# Patient Record
Sex: FEMALE | Race: WHITE | NOT HISPANIC OR LATINO | Employment: OTHER | URBAN - METROPOLITAN AREA
[De-identification: names, ages, dates, MRNs, and addresses within clinical notes are randomized per-mention and may not be internally consistent; named-entity substitution may affect disease eponyms.]

---

## 2017-01-06 ENCOUNTER — GENERIC CONVERSION - ENCOUNTER (OUTPATIENT)
Dept: OTHER | Facility: OTHER | Age: 82
End: 2017-01-06

## 2017-01-13 ENCOUNTER — GENERIC CONVERSION - ENCOUNTER (OUTPATIENT)
Dept: OTHER | Facility: OTHER | Age: 82
End: 2017-01-13

## 2017-04-27 ENCOUNTER — ALLSCRIPTS OFFICE VISIT (OUTPATIENT)
Dept: OTHER | Facility: OTHER | Age: 82
End: 2017-04-27

## 2017-04-27 DIAGNOSIS — M81.0 AGE-RELATED OSTEOPOROSIS WITHOUT CURRENT PATHOLOGICAL FRACTURE: ICD-10-CM

## 2017-04-27 DIAGNOSIS — I25.10 ATHEROSCLEROTIC HEART DISEASE OF NATIVE CORONARY ARTERY WITHOUT ANGINA PECTORIS: ICD-10-CM

## 2017-04-28 ENCOUNTER — GENERIC CONVERSION - ENCOUNTER (OUTPATIENT)
Dept: OTHER | Facility: OTHER | Age: 82
End: 2017-04-28

## 2017-06-02 DIAGNOSIS — M81.0 AGE-RELATED OSTEOPOROSIS WITHOUT CURRENT PATHOLOGICAL FRACTURE: ICD-10-CM

## 2017-06-15 ENCOUNTER — GENERIC CONVERSION - ENCOUNTER (OUTPATIENT)
Dept: OTHER | Facility: OTHER | Age: 82
End: 2017-06-15

## 2017-06-20 ENCOUNTER — GENERIC CONVERSION - ENCOUNTER (OUTPATIENT)
Dept: OTHER | Facility: OTHER | Age: 82
End: 2017-06-20

## 2017-08-21 ENCOUNTER — ALLSCRIPTS OFFICE VISIT (OUTPATIENT)
Dept: OTHER | Facility: OTHER | Age: 82
End: 2017-08-21

## 2017-08-21 DIAGNOSIS — E87.1 HYPO-OSMOLALITY AND HYPONATREMIA: ICD-10-CM

## 2017-08-21 DIAGNOSIS — D69.6 THROMBOCYTOPENIA (HCC): ICD-10-CM

## 2017-08-21 DIAGNOSIS — R42 DIZZINESS AND GIDDINESS: ICD-10-CM

## 2017-08-21 DIAGNOSIS — M12.88 OTHER SPECIFIC ARTHROPATHIES, NOT ELSEWHERE CLASSIFIED, OTHER SPECIFIED SITE: ICD-10-CM

## 2017-08-21 DIAGNOSIS — M54.2 CERVICALGIA: ICD-10-CM

## 2017-08-21 DIAGNOSIS — R53.83 OTHER FATIGUE: ICD-10-CM

## 2017-08-21 DIAGNOSIS — M48.02 SPINAL STENOSIS OF CERVICAL REGION: ICD-10-CM

## 2017-08-21 DIAGNOSIS — R79.89 OTHER SPECIFIED ABNORMAL FINDINGS OF BLOOD CHEMISTRY: ICD-10-CM

## 2017-08-21 DIAGNOSIS — R73.09 OTHER ABNORMAL GLUCOSE: ICD-10-CM

## 2017-08-21 DIAGNOSIS — I95.9 HYPOTENSION: ICD-10-CM

## 2017-08-29 ENCOUNTER — GENERIC CONVERSION - ENCOUNTER (OUTPATIENT)
Dept: OTHER | Facility: OTHER | Age: 82
End: 2017-08-29

## 2017-09-07 ENCOUNTER — GENERIC CONVERSION - ENCOUNTER (OUTPATIENT)
Dept: OTHER | Facility: OTHER | Age: 82
End: 2017-09-07

## 2017-09-11 ENCOUNTER — TRANSCRIBE ORDERS (OUTPATIENT)
Dept: LAB | Facility: CLINIC | Age: 82
End: 2017-09-11

## 2017-09-11 ENCOUNTER — APPOINTMENT (OUTPATIENT)
Dept: LAB | Facility: CLINIC | Age: 82
End: 2017-09-11
Payer: MEDICARE

## 2017-09-11 DIAGNOSIS — I95.9 HYPOTENSION: ICD-10-CM

## 2017-09-11 DIAGNOSIS — R53.83 OTHER FATIGUE: ICD-10-CM

## 2017-09-11 DIAGNOSIS — R42 DIZZINESS AND GIDDINESS: ICD-10-CM

## 2017-09-11 DIAGNOSIS — R73.09 OTHER ABNORMAL GLUCOSE: ICD-10-CM

## 2017-09-11 LAB
ALBUMIN SERPL BCP-MCNC: 2.4 G/DL (ref 3.5–5)
ALP SERPL-CCNC: 212 U/L (ref 46–116)
ALT SERPL W P-5'-P-CCNC: 83 U/L (ref 12–78)
ANION GAP SERPL CALCULATED.3IONS-SCNC: 9 MMOL/L (ref 4–13)
AST SERPL W P-5'-P-CCNC: 51 U/L (ref 5–45)
BACTERIA UR QL AUTO: ABNORMAL /HPF
BASOPHILS # BLD AUTO: 0.03 THOUSANDS/ΜL (ref 0–0.1)
BASOPHILS NFR BLD AUTO: 1 % (ref 0–1)
BILIRUB SERPL-MCNC: 0.84 MG/DL (ref 0.2–1)
BILIRUB UR QL STRIP: ABNORMAL
BUN SERPL-MCNC: 14 MG/DL (ref 5–25)
CALCIUM SERPL-MCNC: 8.7 MG/DL (ref 8.3–10.1)
CHLORIDE SERPL-SCNC: 98 MMOL/L (ref 100–108)
CLARITY UR: ABNORMAL
CO2 SERPL-SCNC: 24 MMOL/L (ref 21–32)
COLOR UR: ABNORMAL
CREAT SERPL-MCNC: 0.83 MG/DL (ref 0.6–1.3)
EOSINOPHIL # BLD AUTO: 0.13 THOUSAND/ΜL (ref 0–0.61)
EOSINOPHIL NFR BLD AUTO: 3 % (ref 0–6)
ERYTHROCYTE [DISTWIDTH] IN BLOOD BY AUTOMATED COUNT: 13.5 % (ref 11.6–15.1)
EST. AVERAGE GLUCOSE BLD GHB EST-MCNC: 128 MG/DL
FOLATE SERPL-MCNC: 19.9 NG/ML (ref 3.1–17.5)
GFR SERPL CREATININE-BSD FRML MDRD: 63 ML/MIN/1.73SQ M
GLUCOSE SERPL-MCNC: 185 MG/DL (ref 65–140)
GLUCOSE UR STRIP-MCNC: NEGATIVE MG/DL
HBA1C MFR BLD: 6.1 % (ref 4.2–6.3)
HCT VFR BLD AUTO: 37.3 % (ref 34.8–46.1)
HGB BLD-MCNC: 12.6 G/DL (ref 11.5–15.4)
HGB UR QL STRIP.AUTO: NEGATIVE
KETONES UR STRIP-MCNC: ABNORMAL MG/DL
LEUKOCYTE ESTERASE UR QL STRIP: ABNORMAL
LYMPHOCYTES # BLD AUTO: 0.54 THOUSANDS/ΜL (ref 0.6–4.47)
LYMPHOCYTES NFR BLD AUTO: 12 % (ref 14–44)
MAGNESIUM SERPL-MCNC: 2.1 MG/DL (ref 1.6–2.6)
MCH RBC QN AUTO: 31.1 PG (ref 26.8–34.3)
MCHC RBC AUTO-ENTMCNC: 33.8 G/DL (ref 31.4–37.4)
MCV RBC AUTO: 92 FL (ref 82–98)
MONOCYTES # BLD AUTO: 0.26 THOUSAND/ΜL (ref 0.17–1.22)
MONOCYTES NFR BLD AUTO: 6 % (ref 4–12)
NEUTROPHILS # BLD AUTO: 3.58 THOUSANDS/ΜL (ref 1.85–7.62)
NEUTS SEG NFR BLD AUTO: 78 % (ref 43–75)
NITRITE UR QL STRIP: NEGATIVE
NON-SQ EPI CELLS URNS QL MICRO: ABNORMAL /HPF
NRBC BLD AUTO-RTO: 0 /100 WBCS
PH UR STRIP.AUTO: 7 [PH] (ref 4.5–8)
PLATELET # BLD AUTO: 88 THOUSANDS/UL (ref 149–390)
PMV BLD AUTO: 10.5 FL (ref 8.9–12.7)
POTASSIUM SERPL-SCNC: 4.5 MMOL/L (ref 3.5–5.3)
PROT SERPL-MCNC: 6 G/DL (ref 6.4–8.2)
PROT UR STRIP-MCNC: ABNORMAL MG/DL
RBC # BLD AUTO: 4.05 MILLION/UL (ref 3.81–5.12)
RBC #/AREA URNS AUTO: ABNORMAL /HPF
SODIUM SERPL-SCNC: 131 MMOL/L (ref 136–145)
SP GR UR STRIP.AUTO: 1.02 (ref 1–1.03)
TSH SERPL DL<=0.05 MIU/L-ACNC: 0.62 UIU/ML (ref 0.36–3.74)
UROBILINOGEN UR QL STRIP.AUTO: 0.2 E.U./DL
VIT B12 SERPL-MCNC: 1193 PG/ML (ref 100–900)
WBC # BLD AUTO: 4.6 THOUSAND/UL (ref 4.31–10.16)
WBC #/AREA URNS AUTO: ABNORMAL /HPF

## 2017-09-11 PROCEDURE — 83735 ASSAY OF MAGNESIUM: CPT

## 2017-09-11 PROCEDURE — 83036 HEMOGLOBIN GLYCOSYLATED A1C: CPT

## 2017-09-11 PROCEDURE — 36415 COLL VENOUS BLD VENIPUNCTURE: CPT

## 2017-09-11 PROCEDURE — 81001 URINALYSIS AUTO W/SCOPE: CPT

## 2017-09-11 PROCEDURE — 85025 COMPLETE CBC W/AUTO DIFF WBC: CPT

## 2017-09-11 PROCEDURE — 82746 ASSAY OF FOLIC ACID SERUM: CPT

## 2017-09-11 PROCEDURE — 82607 VITAMIN B-12: CPT

## 2017-09-11 PROCEDURE — 84443 ASSAY THYROID STIM HORMONE: CPT

## 2017-09-11 PROCEDURE — 80053 COMPREHEN METABOLIC PANEL: CPT

## 2017-09-12 ENCOUNTER — GENERIC CONVERSION - ENCOUNTER (OUTPATIENT)
Dept: OTHER | Facility: OTHER | Age: 82
End: 2017-09-12

## 2017-09-14 ENCOUNTER — GENERIC CONVERSION - ENCOUNTER (OUTPATIENT)
Dept: OTHER | Facility: OTHER | Age: 82
End: 2017-09-14

## 2017-09-19 ENCOUNTER — GENERIC CONVERSION - ENCOUNTER (OUTPATIENT)
Dept: FAMILY MEDICINE CLINIC | Facility: CLINIC | Age: 82
End: 2017-09-19

## 2017-09-22 ENCOUNTER — TRANSCRIBE ORDERS (OUTPATIENT)
Dept: ADMINISTRATIVE | Facility: HOSPITAL | Age: 82
End: 2017-09-22

## 2017-09-22 ENCOUNTER — ALLSCRIPTS OFFICE VISIT (OUTPATIENT)
Dept: OTHER | Facility: OTHER | Age: 82
End: 2017-09-22

## 2017-09-22 ENCOUNTER — APPOINTMENT (OUTPATIENT)
Dept: RADIOLOGY | Facility: CLINIC | Age: 82
End: 2017-09-22
Payer: MEDICARE

## 2017-09-22 ENCOUNTER — TRANSCRIBE ORDERS (OUTPATIENT)
Dept: LAB | Facility: CLINIC | Age: 82
End: 2017-09-22

## 2017-09-22 ENCOUNTER — APPOINTMENT (OUTPATIENT)
Dept: LAB | Facility: CLINIC | Age: 82
End: 2017-09-22
Payer: MEDICARE

## 2017-09-22 DIAGNOSIS — M65.321 TRIGGER INDEX FINGER OF RIGHT HAND: ICD-10-CM

## 2017-09-22 DIAGNOSIS — R63.4 ABNORMAL WEIGHT LOSS: Primary | ICD-10-CM

## 2017-09-22 DIAGNOSIS — R79.89 OTHER SPECIFIED ABNORMAL FINDINGS OF BLOOD CHEMISTRY: ICD-10-CM

## 2017-09-22 DIAGNOSIS — R53.83 FATIGUE, UNSPECIFIED TYPE: ICD-10-CM

## 2017-09-22 DIAGNOSIS — E87.1 HYPO-OSMOLALITY AND HYPONATREMIA: ICD-10-CM

## 2017-09-22 DIAGNOSIS — R41.3 MEMORY LOSS: ICD-10-CM

## 2017-09-22 LAB
ALBUMIN SERPL BCP-MCNC: 3.1 G/DL (ref 3.5–5)
ALP SERPL-CCNC: 92 U/L (ref 46–116)
ALT SERPL W P-5'-P-CCNC: 26 U/L (ref 12–78)
ANION GAP SERPL CALCULATED.3IONS-SCNC: 8 MMOL/L (ref 4–13)
AST SERPL W P-5'-P-CCNC: 15 U/L (ref 5–45)
BASOPHILS # BLD AUTO: 0.03 THOUSANDS/ΜL (ref 0–0.1)
BASOPHILS NFR BLD AUTO: 1 % (ref 0–1)
BILIRUB SERPL-MCNC: 0.56 MG/DL (ref 0.2–1)
BUN SERPL-MCNC: 8 MG/DL (ref 5–25)
CALCIUM SERPL-MCNC: 9.2 MG/DL (ref 8.3–10.1)
CHLORIDE SERPL-SCNC: 104 MMOL/L (ref 100–108)
CO2 SERPL-SCNC: 26 MMOL/L (ref 21–32)
CREAT SERPL-MCNC: 0.7 MG/DL (ref 0.6–1.3)
EOSINOPHIL # BLD AUTO: 0.05 THOUSAND/ΜL (ref 0–0.61)
EOSINOPHIL NFR BLD AUTO: 1 % (ref 0–6)
ERYTHROCYTE [DISTWIDTH] IN BLOOD BY AUTOMATED COUNT: 14.6 % (ref 11.6–15.1)
GFR SERPL CREATININE-BSD FRML MDRD: 78 ML/MIN/1.73SQ M
GLUCOSE P FAST SERPL-MCNC: 142 MG/DL (ref 65–99)
HCT VFR BLD AUTO: 37.4 % (ref 34.8–46.1)
HGB BLD-MCNC: 12.2 G/DL (ref 11.5–15.4)
LYMPHOCYTES # BLD AUTO: 0.82 THOUSANDS/ΜL (ref 0.6–4.47)
LYMPHOCYTES NFR BLD AUTO: 21 % (ref 14–44)
MCH RBC QN AUTO: 30.8 PG (ref 26.8–34.3)
MCHC RBC AUTO-ENTMCNC: 32.6 G/DL (ref 31.4–37.4)
MCV RBC AUTO: 94 FL (ref 82–98)
MONOCYTES # BLD AUTO: 0.3 THOUSAND/ΜL (ref 0.17–1.22)
MONOCYTES NFR BLD AUTO: 8 % (ref 4–12)
NEUTROPHILS # BLD AUTO: 2.58 THOUSANDS/ΜL (ref 1.85–7.62)
NEUTS SEG NFR BLD AUTO: 69 % (ref 43–75)
NRBC BLD AUTO-RTO: 0 /100 WBCS
PLATELET # BLD AUTO: 232 THOUSANDS/UL (ref 149–390)
PMV BLD AUTO: 9 FL (ref 8.9–12.7)
POTASSIUM SERPL-SCNC: 3.8 MMOL/L (ref 3.5–5.3)
PROT SERPL-MCNC: 6.5 G/DL (ref 6.4–8.2)
RBC # BLD AUTO: 3.96 MILLION/UL (ref 3.81–5.12)
SODIUM SERPL-SCNC: 138 MMOL/L (ref 136–145)
WBC # BLD AUTO: 3.84 THOUSAND/UL (ref 4.31–10.16)

## 2017-09-22 PROCEDURE — 73140 X-RAY EXAM OF FINGER(S): CPT

## 2017-09-22 PROCEDURE — 80053 COMPREHEN METABOLIC PANEL: CPT

## 2017-09-22 PROCEDURE — 85025 COMPLETE CBC W/AUTO DIFF WBC: CPT

## 2017-09-22 PROCEDURE — 36415 COLL VENOUS BLD VENIPUNCTURE: CPT

## 2017-09-23 ENCOUNTER — GENERIC CONVERSION - ENCOUNTER (OUTPATIENT)
Dept: OTHER | Facility: OTHER | Age: 82
End: 2017-09-23

## 2017-09-27 ENCOUNTER — GENERIC CONVERSION - ENCOUNTER (OUTPATIENT)
Dept: OTHER | Facility: OTHER | Age: 82
End: 2017-09-27

## 2017-09-27 ENCOUNTER — HOSPITAL ENCOUNTER (OUTPATIENT)
Dept: MRI IMAGING | Facility: HOSPITAL | Age: 82
Discharge: HOME | End: 2017-09-27
Attending: INTERNAL MEDICINE
Payer: MEDICARE

## 2017-09-27 DIAGNOSIS — R53.83 FATIGUE, UNSPECIFIED TYPE: ICD-10-CM

## 2017-09-27 PROCEDURE — 70553 MRI BRAIN STEM W/O & W/DYE: CPT

## 2017-09-27 PROCEDURE — A9585 GADOBUTROL INJECTION: HCPCS | Performed by: INTERNAL MEDICINE

## 2017-09-27 RX ADMIN — GADOBUTROL 4 ML: 604.72 INJECTION INTRAVENOUS at 15:20

## 2017-09-28 ENCOUNTER — GENERIC CONVERSION - ENCOUNTER (OUTPATIENT)
Dept: OTHER | Facility: OTHER | Age: 82
End: 2017-09-28

## 2017-10-05 ENCOUNTER — GENERIC CONVERSION - ENCOUNTER (OUTPATIENT)
Dept: OTHER | Facility: OTHER | Age: 82
End: 2017-10-05

## 2017-10-06 NOTE — PROGRESS NOTES
Assessment  1  Abnormal weight loss (783 21) (R63 4)   2  Non Hodgkin's lymphoma (202 80) (C85 90)   3  Early satiety (780 94) (R68 81)   4  Memory loss of unknown cause (780 93) (R41 3)   · Very mild and not interferring with ADL  Discussed starting meds, but deferred  5  Sleep disturbances (780 50) (G47 9)    Plan  Abnormal weight loss, Early satiety, Loss of appetite    · *1 -  [facility] Co-Management  *  Status: Hold For - Scheduling  Requested  for: 08YUS3889  Care Summary provided  : Yes  Abnormal weight loss, Non Hodgkin's lymphoma    · 3 - Ananya Sheriff  (Hematology/Oncology) Co-Management  *  Status: Hold For - Scheduling   Requested for: 64FGI2594  are Referring to a non- Preferred Provider : Established Patient  Care Summary provided  : Yes  Cervical facet syndrome, Cervicalgia, Stenosis, cervical spine    · PredniSONE 10 MG Oral Tablet  Memory loss of unknown cause    · Donepezil HCl - 10 MG Oral Tablet; TAKE 1 TABLET BY MOUTH ONCE DAILY  PMH: Vitamin B12 deficiency    · Vitamin B-12 500 MCG Oral Tablet    Discussion/Summary  Discussion Summary:   ??cause  ?all due to worsening dementia  H/o lymphoma and will refer back to onc for second opinion  Refer to GI due to appetite issue  Pt to try better sleep hygiene and pt to increase her melatonin  Continue with supplements  Will increase the aricept  RTC three weeks  Medication SE Review and Pt Understands Tx: Possible side effects of new medications were reviewed with the patient/guardian today  The treatment plan was reviewed with the patient/guardian  The patient/guardian understands and agrees with the treatment plan      Chief Complaint  Chief Complaint Free Text Note Form: The patient presents in office today for a follow up  She has some questions from her pervious hospitalization- She would also like to go over all her recent testing        History of Present Illness  HPI: WF RTC with her neighbor for f/u FTT and wt loss of unknown etiology  Since last visit, wt down another three pounds despite supplements  Remains active and using supplements, but reports becoming full after several bites  Memory still an issue at times  Still can't sleep, but reported to nap during the day  Feeling better otherwise and no s/s pointing in any direction  Extension w/u negative with MRI of the head negative  Review of Systems  Complete-Female:   Constitutional: feeling tired, but-no fever,-not feeling poorly-and-no chills  Cardiovascular: no chest pain,-no intermittent leg claudication,-no palpitations-and-no lower extremity edema  Respiratory: no shortness of breath,-no cough,-no orthopnea,-no wheezing,-no shortness of breath during exertion-and-no PND  Gastrointestinal: no abdominal pain,-no nausea,-no constipation-and-no diarrhea  Genitourinary: no dysuria  Musculoskeletal: No complaints of arthralgias, no myalgias, no joint swelling or stiffness, no limb pain or swelling  Integumentary: No complaints of skin rash or lesions, no itching, no skin wounds, no breast pain or lump  Neurological: confusion, but-as noted in HPI,-no headache-and-no convulsions  Psychiatric: no anxiety-and-no depression  Endocrine: No complaints of proptosis, no hot flashes, no muscle weakness, no deepening of the voice, no feelings of weakness  Hematologic/Lymphatic: No complaints of swollen glands, no swollen glands in the neck, does not bleed easily, does not bruise easily  Active Problems  1  Abnormal glucose (790 29) (R73 09)   2  Abnormal thyroid blood test (794 5) (R94 6)   3  Abnormal weight loss (783 21) (R63 4)   4  Asymptomatic menopausal state (V49 81) (Z78 0)   5  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 85) (K22 70)   6  Cardiovascular arteriosclerosis (429 2,440 9) (I25 10)   7  Cervical facet syndrome (723 8) (M12 88)   8  Cervicalgia (723 1) (M54 2)   9  Cervico-occipital neuralgia of left side (723 8) (M54 81)   10   Contact dermatitis (692 9) (L25 9)   11  Daily nausea (787 02) (R11 0)   12  Decreased range of motion of intervertebral discs of cervical spine (723 8) (M53 82)   13  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) (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)   14  Diverticulosis (562 10) (K57 90)   15  Dizziness (780 4) (R42)   16  Elevated LFTs (790 6) (R79 89)   17  Encounter for screening mammogram for malignant neoplasm of breast (V76 12) (Z12 31)   18  Fatigue (780 79) (R53 83)   19  Fibrocystic breast disease, unspecified laterality (610 1) (N60 19)   20  Glaucoma (365 9) (H40 9)   21  Hypercholesterolemia (272 0) (E78 00)   22  Hypertension (401 9) (I10)   23  Hyponatremia (276 1) (E87 1)   24  Hypotension (458 9) (I95 9)   25  Loss of appetite (783 0) (R63 0)   26  Memory loss of unknown cause (780 93) (R41 3)   27  Non Hodgkin's lymphoma (202 80) (C85 90)   28  Osteoarthritis (715 90) (M19 90)   29  Osteoporosis (733 00) (M81 0)   30  Overactive bladder (596 51) (N32 81)   31  Peripheral neuropathy (356 9) (G62 9)   32  Raynaud's disease (443 0) (I73 00)   33  Screening for genitourinary condition (V81 6) (Z13 89)   34  Sleep disturbances (780 50) (G47 9)   35  Special screening for other neurological conditions (V80 09) (Z13 89)   36  Stenosis, cervical spine (723 0) (M48 02)   37  Thrombocytopenia (287 5) (D69 6)   38  Trigger index finger of right hand (727 03) (M65 321)   39  Urinary incontinence (788 30) (R32)   40  Urinary tract infection (599 0) (N39 0)    Past Medical History  1  History of Acute leg pain, right (729 5) (M79 604)   2  History of Belching (787 3) (R14 2)   3  History of Change in stool habits (787 99) (R19 4)   4  History of Cold intolerance (780 99) (R68 89)   5  History of Costochondritis (733 6) (M94 0)   6  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)   7  History of Drug intolerance (995 27) (Z78 9)   8  History of Dysgeusia (781 1) (R43 2)   9  History of Dysuria (788 1) (R30 0)   10   History of Elevated rheumatoid factor (795 79) (R76 8)   11  History of Fall, initial encounter (E888 9) (W19 XXXA)   12  History of Gait disturbance (781 2) (R26 9)   13  History of Generalized abdominal pain (789 07) (R10 84)   14  History of Healing wound (959 9) (T14 90XD)   15  History of Hematoma (924 9) (T14 8XXA)   16  History of acute sinusitis (V12 69) (Z87 09)   17  History of allergic rhinitis (V12 69) (Z87 09)   18  History of cataract (V12 49) (Z86 69)   19  History of gastric ulcer (V12 79) (Z87 19)   20  History of herpes zoster (V12 09) (Z86 19)   21  History of paronychia of finger (V13 3) (Z87 2)   22  History of paronychia of finger (V13 3) (Z87 2)   23  History of sleep disturbance (V13 89) (Z87 898)   24  History of syncope (V15 89) (Z87 898)   25  History of vertigo (V12 49) (Z87 898)   26  History of Internal Derangement Of Medial Meniscus Of Knee (717 3)   27  History of Joint pain, knee (719 46) (M25 569)   28  History of Knee Injury (959 7)   29  History of Lateral epicondylitis of right elbow (726 32) (M77 11)   30  History of Leg swelling (729 81) (M79 89)   31  History of Muscle Hematoma (924 9)   32  History of Rectal discomfort (569 42) (K62 89)   33  History of Skin lesion (709 9) (L98 9)   34  History of Sprained Ribs (848 3)   35  History of Strain of right knee, initial encounter (844 9) (S86 911A)   36  History of Synovial cyst of popliteal space (727 51) (M71 20)  Active Problems And Past Medical History Reviewed: The active problems and past medical history were reviewed and updated today  Surgical History  1  History of Cataract Surgery  Surgical History Reviewed: The surgical history was reviewed and updated today  Family History  Father    1  Family history of Colon Cancer (V16 0)   2  Family history of malignant neoplasm (V16 9) (Z80 9)  Paternal Grandmother    3  Family history of Malignant neoplasm of breast, unspecified laterality  Paternal Grandfather    4   Family history of Coronary artery disease (414 00) (I25 10)  Family History Reviewed: The family history was reviewed and updated today  Social History   · Never A Smoker   · Social alcohol use (Z78 9)  Social History Reviewed: The social history was reviewed and updated today  The social history was reviewed and is unchanged  Current Meds   1  Brimonidine Tartrate 0 15 % Ophthalmic Solution; Therapy: 56MER4755 to Recorded   2  Calcium Carbonate-Vitamin D 600-125 MG-UNIT TABS; Take 2 twice daily; Therapy: 63VXY9141 to Recorded   3  Donepezil HCl - 5 MG Oral Tablet; Take 1 tablet by mouth at bedtime; Therapy: 79YJH6937 to (Evaluate:11Aug2017)  Requested for: 33Nnv3360; Last Rx:55Bqy2170   Ordered   4  Evista 60 MG Oral Tablet; TAKE ONE (1) TABLET BY MOUTH ONCE DAILY; Therapy: 38OLU7176 to Recorded   5  Fish Oil 1000 MG Oral Capsule; TAKE 1 CAPSULE DAILY; Therapy: 65NZY1255 to Recorded   6  Glucosamine 500 MG Oral Capsule; Take 1 three times daily; Therapy: 02NCB6277 to Recorded   7  Lansoprazole 30 MG Oral Capsule Delayed Release; take 1 capsule daily; Therapy: 26MVZ9379 to (Evaluate:73Vqx1631)  Requested for: 81Gya0637; Last Rx:30Lgw4277   Ordered   8  Magnesium 500 MG Oral Capsule; 1 tab PO daily; Therapy: 14LFN0567 to Recorded   9  Melatonin 3 MG Oral Tablet; One po q hs;   Therapy: 06Pwb7054 to (Last Rx:62Olw5770)  Requested for: 56Qhi1445 Ordered   10  Mometasone Furoate 0 1 % External Cream; apply sparingly to affected area tid; Therapy: 70RWM8705 to (Last Arnol Mcallister)  Requested for: 39BPX6072 Ordered   11  Myrbetriq 25 MG Oral Tablet Extended Release 24 Hour; Take 1 tablet daily; Therapy: 41Ylo4303 to [phone] [phone])  Requested for: 43FJO1888; Last Rx:69Rjp7833    Ordered   12  Os-Robert Calcium + D3 500-200 MG-UNIT Oral Tablet; TAKE 1 TABLET BY MOUTH 2 TIMES A DAY    (OSTEOPEROSIS); Therapy: 95Isk5992 to (Evaluate:13Oct2017)  Requested for: 14Ica9943; Last Rx:73Rwt5646    Ordered   13  PredniSONE 10 MG Oral Tablet; TAKE 4 TABS DAILY X 5 DAYS, 3 TABS X 5 DAYS, 2 TABS X 5    DAYS, THEN 1 TAB X 5 DAYS; Therapy: 60Jsc1745 to (Evaluate:19Oct2017)  Requested for: 85Qbl7604; Last Rx:29Sep2017    Ordered   14  Raloxifene HCl - 60 MG Oral Tablet; TAKE 1 TABLET DAILY; Therapy: 65JTS2898 to (Evaluate:75Hrn9906)  Requested for: 37KNO9668; Last Rx:29Sep2017    Ordered   15  SB Low Dose ASA EC 81 MG Oral Tablet Delayed Release; Take 1 tablet daily; Therapy: 28ROP7332 to (Evaluate:19Oct2013) Recorded   16  Travatan Z 0 004 % Ophthalmic Solution; Therapy: 73WUY1364 to (Evaluate:19Nov2016) Recorded   17  Vitamin B-12 500 MCG Oral Tablet; TAKE 1 TABLET DAILY; Therapy: 52ADM6068 to (Evaluate:35Lbc6514) Recorded  Medication List Reviewed: The medication list was reviewed and updated today  Allergies  1  Codeine Sulfate TABS   2  Fosamax TABS   3  Levaquin TABS   4  Penicillins    Vitals  Vital Signs    Recorded: 15FGQ4104 03:16PM   Temperature 98 6 F   Heart Rate 92   Respiration 20   Systolic 885   Diastolic 76   Height 5 ft 3 5 in   Weight 110 lb    BMI Calculated 19 18   BSA Calculated 1 5     Physical Exam    Constitutional   General appearance: No acute distress, well appearing and well nourished  Eyes   Conjunctiva and lids: No swelling, erythema or discharge  Pupils and irises: Equal, round and reactive to light  Ears, Nose, Mouth, and Throat   Oropharynx: Normal with no erythema, edema, exudate or lesions  Pulmonary   Respiratory effort: No increased work of breathing or signs of respiratory distress  Auscultation of lungs: Clear to auscultation  Cardiovascular   Auscultation of heart: Normal rate and rhythm, normal S1 and S2, without murmurs  Examination of extremities for edema and/or varicosities: Normal     Carotid pulses: Normal     Abdomen   Abdomen: Non-tender, no masses  Lymphatic   Palpation of lymph nodes in neck: No lymphadenopathy      Musculoskeletal Gait and station: Normal     Neurologic   Cranial nerves: Cranial nerves 2-12 intact  Psychiatric   Orientation to person, place, and time: Abnormal  -some forgetfulness  Mood and affect: Normal          Health Management  Encounter for screening mammogram for malignant neoplasm of breast   Digital Bilateral Screening Mammogram With CAD; every 1 year; Last 03LYQ8476; Next Due:  49FNA2026;  Overdue    Signatures   Electronically signed by : JOSUE Javier ; Oct  5 2017  4:30PM EST                       (Author)

## 2017-10-11 ENCOUNTER — GENERIC CONVERSION - ENCOUNTER (OUTPATIENT)
Dept: OTHER | Facility: OTHER | Age: 82
End: 2017-10-11

## 2018-01-10 NOTE — RESULT NOTES
Verified Results  * [facility] FINGER RIGHT SECOND DIGIT-INDEX 36KFP5777 10:57AM Vanessa Dobbins Order Number: [id number]     Test Name Result Flag Reference   XR FINGER RIGHT SECOND DIGIT-INDEX (Report)     RIGHT FINGER     INDICATION: Trigger index finger of the right hand  COMPARISON: None     VIEWS:  PA view hand and 2 cone-down views of the 2nd digit     IMAGES: 3     For the purposes of institution wide universal language the following terms will apply: (thumb=1st digit/finger, index finger=2nd digit/finger, long finger=3rd digit/finger, ring=4th digit/finger and small finger=5th digit/finger)     FINDINGS:     There is no acute fracture or dislocation  There is severe thumb interphalangeal joint osteoarthritis  There is severe index finger distal interphalangeal joint osteoarthritis  There is severe 3rd metacarpophalangeal joint osteoarthritis, and mild long finger PIP and DIP osteoarthritis  There is severe DIP and PIP joint osteoarthritis in the ring and small fingers  There is also severe 1st carpometacarpal joint osteophytes, and radiocarpal compartment osteoarthritis  No lytic or blastic lesion  Soft tissues are unremarkable  IMPRESSION:     Severe osteoarthritis in the hand and wrist as described above         Workstation performed: [id number]     Signed by:   Sally Mccarthy MD   9/27/17

## 2018-01-10 NOTE — RESULT NOTES
Verified Results  (1) CBC/PLT/DIFF 04Cgp1464 10:25AM Nikole Gutiérrez Order Number: [id number]     Test Name Result Flag Reference   WBC COUNT 4 60 Thousand/uL  4 31-10 16   RBC COUNT 4 05 Million/uL  3 81-5 12   HEMOGLOBIN 12 6 g/dL  11 5-15 4   HEMATOCRIT 37 3 %  34 8-46  1   MCV 92 fL  82-98   MCH 31 1 pg  26 8-34 3   MCHC 33 8 g/dL  31 4-37 4   RDW 13 5 %  11 6-15 1   MPV 10 5 fL  8 9-12 7   PLATELET COUNT 88 Thousands/uL L 149-390   Manual Review of Smear Performed   nRBC AUTOMATED 0 /100 WBCs     NEUTROPHILS RELATIVE PERCENT 78 % H 43-75   LYMPHOCYTES RELATIVE PERCENT 12 % L 14-44   MONOCYTES RELATIVE PERCENT 6 %  4-12   EOSINOPHILS RELATIVE PERCENT 3 %  0-6   BASOPHILS RELATIVE PERCENT 1 %  0-1   NEUTROPHILS ABSOLUTE COUNT 3 58 Thousands/? ??L  1 85-7 62   LYMPHOCYTES ABSOLUTE COUNT 0 54 Thousands/? ??L L 0 60-4 47   MONOCYTES ABSOLUTE COUNT 0 26 Thousand/? ??L  0 17-1 22   EOSINOPHILS ABSOLUTE COUNT 0 13 Thousand/? ??L  0 00-0 61   BASOPHILS ABSOLUTE COUNT 0 03 Thousands/? ??L  0 00-0 10     (1) COMPREHENSIVE METABOLIC PANEL 69IMG7419 37:01AQ Nikole Gutiérrez Order Number: [id number]     Test Name Result Flag Reference   GLUCOSE,RANDM 185 mg/dL H    If the patient is fasting, the ADA then defines impaired fasting glucose as > 100 mg/dL and diabetes as > or equal to 123 mg/dL  Specimen collection should occur prior to Sulfasalazine administration due to the potential for falsely depressed results  Specimen collection should occur prior to Sulfapyridine administration due to the potential for falsely elevated results     SODIUM 131 mmol/L L 136-145   POTASSIUM 4 5 mmol/L  3 5-5 3   CHLORIDE 98 mmol/L L 100-108   CARBON DIOXIDE 24 mmol/L  21-32   ANION GAP (CALC) 9 mmol/L  4-13   BLOOD UREA NITROGEN 14 mg/dL  5-25   CREATININE 0 83 mg/dL  0 60-1 30   Standardized to IDMS reference method   CALCIUM 8 7 mg/dL  8 3-10 1   BILI, TOTAL 0 84 mg/dL  0 20-1 00   ALK PHOSPHATAS 212 U/L H    ALT (SGPT) 83 U/L H 12-78   Specimen collection should occur prior to Sulfasalazine and/or Sulfapyridine administration due to the potential for falsely depressed results  AST(SGOT) 51 U/L H 5-45   Specimen collection should occur prior to Sulfasalazine administration due to the potential for falsely depressed results  ALBUMIN 2 4 g/dL L 3 5-5 0   TOTAL PROTEIN 6 0 g/dL L 6 4-8 2   eGFR 63 ml/min/1 73sq m     National Kidney Disease Education Program recommendations are as follows:  GFR calculation is accurate only with a steady state creatinine  Chronic Kidney disease less than 60 ml/min/1 73 sq  meters  Kidney failure less than 15 ml/min/1 73 sq  meters  (1) HEMOGLOBIN A1C 34Iwn8121 10:25AM [employer] Order Number: [id number]     Test Name Result Flag Reference   HEMOGLOBIN A1C 6 1 %  4 2-6 3   EST  AVG  GLUCOSE 128 mg/dl       (1) VITAMIN B12 31Ixx3265 10:25AM [employer] Order Number: [id number]     Test Name Result Flag Reference   VITAMIN B12 1193 pg/mL H 100-900     (1) FOLATE 19Ijd3960 10:25AM [employer] Order Number: [id number]     Test Name Result Flag Reference   FOLATE 19 9 ng/mL H 3 1-17 5     (1) MAGNESIUM 87Sbe8633 10:25AM [employer] Order Number: [id number]     Test Name Result Flag Reference   MAGNESIUM 2 1 mg/dL  1 6-2 6     (1) TSH 67Vbj1437 10:25AM [employer] Order Number: [id number]     Test Name Result Flag Reference   TSH 0 622 uIU/mL  0 358-3 740   Patients undergoing fluorescein dye angiography may retain small amounts of fluorescein in the body for 48-72 hours post procedure  Samples containing fluorescein can produce falsely depressed TSH values  If the patient had this procedure,a specimen should be resubmitted post fluorescein clearance            The recommended reference ranges for TSH during pregnancy are as follows:  First trimester 0 1 to 2 5 uIU/mL  Second trimester  0 2 to 3 0 uIU/mL  Third trimester 0 3 to 3 0 uIU/m     (1) URINALYSIS (will reflex a microscopy if leukocytes, occult blood, protein or nitrites are not within normal limits) 21Ybb3754 10:25AM Jean Paul Emili Order Number: [id number]     Test Name Result Flag Reference   COLOR Dk Yellow     CLARITY Cloudy     SPECIFIC GRAVITY UA 1 016  1 003-1 030   PH UA 7 0  4 5-8 0   LEUKOCYTE ESTERASE UA Moderate A Negative   NITRITE UA Negative  Negative   PROTEIN UA 30 (1+) mg/dl A Negative   GLUCOSE UA Negative mg/dl  Negative   KETONES UA Trace mg/dl A Negative   UROBILINOGEN UA 0 2 E U /dl  0 2, 1 0 E U /dl   BILIRUBIN UA  A Negative   Interference- unable to analyze   The dipstick result may be falsely positive do to interfering substances  We recommend reliance upon serum bilirubin, liver & kidney function tests to guide patient care if clinically indicated     BLOOD UA Negative  Negative   BACTERIA Occasional /hpf  None Seen, Occasional   EPITHELIAL CELLS Occasional /hpf  None Seen, Occasional   RBC UA None Seen /hpf  None Seen   WBC UA 20-30 /hpf A None Seen

## 2018-01-10 NOTE — RESULT NOTES
Verified Results  * MRI BRAIN W 222 Pixtr 27TFS2111 02:24PM Jorge Hollis     Test Name Result Flag Reference   MRI BRAIN W WO CONTRAST (Report)     MRI BRAIN WITH AND WITHOUT CONTRAST     INDICATION: Paresthesias in bilateral distal lower extremities     COMPARISON: None  TECHNIQUE:   Sagittal T1, axial T2, axial FLAIR, axial T1, axial Gradient, axial diffusion  Sagittal, axial and coronal T1 postcontrast  Axial BRAVO post contrast       IV Contrast: 4 mL of Gadobutrol injection (SINGLE-DOSE)       IMAGE QUALITY:  Diagnostic  FINDINGS:     BRAIN PARENCHYMA: No acute disease  There is no acute ischemia, intracranial mass, mass effect or edema  Age-appropriate volume loss, parenchymal changes consistent with relatively mild degree of chronic small vessel disease  Postcontrast imaging of the brain demonstrates no abnormal enhancement  VENTRICLES: Reflect age-appropriate volume loss     SELLA AND PITUITARY GLAND: Normal      ORBITS: Normal      PARANASAL SINUSES: Minor mucosal thickening right maxillary sinus  VASCULATURE: Evaluation of the major intracranial vasculature demonstrates appropriate flow voids  Minor vertebrobasilar hypoplasia with prominent bilateral posterior communicating arteries  CALVARIUM AND SKULL BASE: Normal      EXTRACRANIAL SOFT TISSUES: Normal        IMPRESSION:     No acute disease  Age-appropriate volume loss, mild degree of chronic small vessel disease  Workstation performed: [id number]     Signed by:    Rita Carrion MD   9/28/17

## 2018-01-11 NOTE — MISCELLANEOUS
Assessment    1  Abnormal weight loss (783 21) (R63 4)   2  Peripheral neuropathy (356 9) (G62 9)   3  Hyponatremia (276 1) (E87 1)   4  Urinary tract infection (599 0) (N39 0)   5  Urinary incontinence (788 30) (R32)   6  Elevated LFTs (790 6) (R79 89)   7  Contact dermatitis (692 9) (L25 9)   8  Trigger index finger of right hand (727 03) (M65 321)    Plan  Abnormal weight loss, Fatigue, Loss of appetite, Memory loss of unknown cause, Non  Hodgkin's lymphoma    · * MRI BRAIN W WO CONTRAST; Status:Need Information - Financial Authorization; Requested for:2017;    Perform:[facility]; XJH:[id number]; Ordered; For:Abnormal weight loss, Fatigue, Loss of appetite, Memory loss of unknown cause, Non Hodgkin's lymphoma; Ordered By:Pato Ceballos;  Contact dermatitis    · Mometasone Furoate 0 1 % External Cream; apply sparingly to affected area tid   Rx By: Monica Sellers; Dispense: 0 Days ; #:60 GM; Refill: 1; For: Contact dermatitis; MILADYS = N; Verified Transmission to [facility]/PHARMACY #[id number] Last Updated By: System, SureScripts; 2017 10:35:36 AM  Elevated LFTs, Hyponatremia    · (1) CBC/PLT/DIFF; Status:Active; Requested for:2017;    Perform:[facility]; [id number]; Ordered;  For:Elevated LFTs, Hyponatremia; Ordered By:Pato Ceballos;   · (1) COMPREHENSIVE METABOLIC PANEL; Status:Active; Requested for:2017;    Perform:[facility]; UZN:[id number]; Ordered;  For:Elevated LFTs, Hyponatremia; Ordered By:Gosia Ceballos; Health Maintenance    · Fluzone High-Dose 0 5 ML Intramuscular Suspension Prefilled Syringe;  INJECT 0 5  ML Intramuscular; To Be Done: [id number]   For: Health Maintenance; Ordered By:Pato Ceballos; Effective Date:2017  Trigger index finger of right hand    · * XR FINGER RIGHT SECOND DIGIT-INDEX; Status:Active; Requested for:2017;    Perform:[facility]; MWF:[id number]; Ordered;   For:Trigger index finger of right hand; Ordered By:Pato Ceballos; · *1 - [facility]  *  Status: Hold For - Scheduling  Requested  for: 29YYU5748   Ordered; For: Trigger index finger of right hand; Ordered By: Suma Espana Performed:  Due: 27KLS4877  Care Summary provided  : Yes  Urinary incontinence    · Myrbetriq 25 MG Oral Tablet Extended Release 24 Hour; Take 1 tablet daily   Rx By: Suma Espana; Dispense: 30 Days ; #:30 Tablet Extended Release 24 Hour; Refill: 0; For: Urinary incontinence; MILADYS = N; Verified Transmission to [facility]/PHARMACY #[id number] Last Updated By: SystemzeeWAVES; 9/21/2017 8:50:24 AM    Discussion/Summary  Discussion Summary:   Recheck labs  Home PT  Check xray and refer to ortho for the finger  Had CT of chest, abd, and pelvis  Will check MRI of the head  To push po diet  Steroid cream to the back  RTC 1-2 weeks  Medication SE Review and Pt Understands Tx: Possible side effects of new medications were reviewed with the patient/guardian today  The treatment plan was reviewed with the patient/guardian  The patient/guardian understands and agrees with the treatment plan      Chief Complaint  Chief Complaint Free Text Note Form: hospital follow up, feeling tired, complains of back rash      History of Present Illness  TCM Communication [facility]: The patient is being contacted for follow-up after hospitalization and 09/22/2017  Hospital records were not available  She was hospitalized at [facility]  The dates of hospitalization:, date of admission: 09/13/2017, date of discharge: 09/20/2017  Diagnosis: TINGLING IN LEGS  She was discharged to home  She scheduled a follow up appointment  Communication performed and completed by  09/20/2017   HPI: BERNY presents with her daughter for f/u [facility] admission  Pt with recent wt loss, elevated LFT, low sodium, Memory issues, sleep disturbances, and general overall FTT  Pt with bilat leg pain and tingling  W/u negative so far   Admittend and sodium corrected, UTI treated, and put back on her OAB, which was held to see if it was causing any of her s/s  No better  Wt down further, now has a right index finger that locks, and very itchy back rash  Daughter reports intermittent tremors of the hands and pt reported right sided head and neck pain on several occasions  Review of Systems  Complete-Female:   Constitutional: feeling poorly and feeling tired, but no fever and no chills  Eyes: No complaints of eye pain, no red eyes, no eyesight problems, no discharge, no dry eyes, no itching of eyes  ENT: no complaints of earache, no loss of hearing, no nose bleeds, no nasal discharge, no sore throat, no hoarseness  Cardiovascular: no chest pain, no intermittent leg claudication, no palpitations and no lower extremity edema  Respiratory: no shortness of breath, no cough, no orthopnea, no wheezing, no shortness of breath during exertion and no PND  Gastrointestinal: no abdominal pain, no nausea, no vomiting, no constipation and no diarrhea  Genitourinary: no dysuria and no incontinence  Musculoskeletal: as noted in HPI  Integumentary: a rash, but as noted in HPI  Neurological: as noted in HPI, no headache, no confusion and no convulsions  Psychiatric: no anxiety and no depression  Endocrine: No complaints of proptosis, no hot flashes, no muscle weakness, no deepening of the voice, no feelings of weakness  Hematologic/Lymphatic: No complaints of swollen glands, no swollen glands in the neck, does not bleed easily, does not bruise easily  Active Problems    1  Abnormal glucose (790 29) (R73 09)   2  Abnormal thyroid blood test (794 5) (R94 6)   3  Abnormal weight loss (783 21) (R63 4)   4  Asymptomatic menopausal state (V49 81) (Z78 0)   5  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 85) (K22 70)   6  Cardiovascular arteriosclerosis (429 2,440 9) (I25 10)   7  Cervical facet syndrome (723 8) (M12 88)   8  Cervicalgia (723 1) (M54 2)   9  Cervico-occipital neuralgia of left side (723 8) (M54 81)   10   Daily nausea (787 02) (R11 0)   11  Decreased range of motion of intervertebral discs of cervical spine (723 8) (M53 82)   12  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) (M50 320)   13  Diverticulosis (562 10) (K57 90)   14  Dizziness (780 4) (R42)   15  Elevated LFTs (790 6) (R79 89)   16  Encounter for screening mammogram for malignant neoplasm of breast (V76 12)    (Z12 31)   17  Fatigue (780 79) (R53 83)   18  Fibrocystic breast disease, unspecified laterality (610 1) (N60 19)   19  Glaucoma (365 9) (H40 9)   20  Hypercholesterolemia (272 0) (E78 00)   21  Hypertension (401 9) (I10)   22  Hyponatremia (276 1) (E87 1)   23  Hypotension (458 9) (I95 9)   24  Loss of appetite (783 0) (R63 0)   25  Memory loss of unknown cause (780 93) (R41 3)   26  Non Hodgkin's lymphoma (202 80) (C85 90)   27  Osteoarthritis (715 90) (M19 90)   28  Osteoporosis (733 00) (M81 0)   29  Overactive bladder (596 51) (N32 81)   30  Raynaud's disease (443 0) (I73 00)   31  Screening for genitourinary condition (V81 6) (Z13 89)   32  Sleep disturbances (780 50) (G47 9)   33  Special screening for other neurological conditions (V80 09) (Z13 89)   34  Stenosis, cervical spine (723 0) (M48 02)   35  Thrombocytopenia (287 5) (D69 6)   36  Urinary incontinence (788 30) (R32)    Past Medical History    1  History of Acute leg pain, right (729 5) (M79 604)   2  History of Belching (787 3) (R14 2)   3  History of Change in stool habits (787 99) (R19 4)   4  History of Cold intolerance (780 99) (R68 89)   5  History of Costochondritis (733 6) (M94 0)   6  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)   7  History of Drug intolerance (995 27) (Z78 9)   8  History of Dysgeusia (781 1) (R43 2)   9  History of Dysuria (788 1) (R30 0)   10  History of Early satiety (780 94) (R68 81)   11  History of Elevated rheumatoid factor (795 79) (R76 8)   12  History of Fall, initial encounter (E888 9) (W19 XXXA)   13   History of Gait disturbance (781  2) (R26 9)   14  History of Generalized abdominal pain (789 07) (R10 84)   15  History of Healing wound (959 9) (T14 90)   16  History of Hematoma (924 9) (T14 8)   17  History of acute sinusitis (V12 69) (Z87 09)   18  History of allergic rhinitis (V12 69) (Z87 09)   19  History of cataract (V12 49) (Z86 69)   20  History of gastric ulcer (V12 79) (Z87 19)   21  History of herpes zoster (V12 09) (Z86 19)   22  History of paronychia of finger (V13 3) (Z87 2)   23  History of paronychia of finger (V13 3) (Z87 2)   24  History of sleep disturbance (V13 89) (Z87 898)   25  History of syncope (V15 89) (Z87 898)   26  History of vertigo (V12 49) (Z87 898)   27  History of Internal Derangement Of Medial Meniscus Of Knee (717 3)   28  History of Joint pain, knee (719 46) (M25 569)   29  History of Knee Injury (959 7)   30  History of Lateral epicondylitis of right elbow (726 32) (M77 11)   31  History of Leg swelling (729 81) (M79 89)   32  History of Muscle Hematoma (924 9)   33  History of Rectal discomfort (569 42) (K62 89)   34  History of Skin lesion (709 9) (L98 9)   35  History of Sprained Ribs (848 3)   36  History of Strain of right knee, initial encounter (844 9) (S86 911A)   37  History of Synovial cyst of popliteal space (727 51) (M71 20)    Surgical History    1  History of Cataract Surgery  Surgical History Reviewed: The surgical history was reviewed and updated today  Family History  Father    1  Family history of Colon Cancer (V16 0)   2  Family history of malignant neoplasm (V16 9) (Z80 9)  Paternal Grandmother    3  Family history of Malignant neoplasm of breast, unspecified laterality  Paternal Grandfather    4  Family history of Coronary artery disease (414 00) (I25 10)  Family History Reviewed: The family history was reviewed and updated today  Social History    · Never A Smoker   · Social alcohol use (Z78 9)  Social History Reviewed:  The social history was reviewed and updated today  The social history was reviewed and is unchanged  Current Meds   1  Brimonidine Tartrate 0 15 % Ophthalmic Solution; Therapy: 09VHS1400 to Recorded   2  Calcium Carbonate-Vitamin D 600-125 MG-UNIT TABS; Take 2 twice daily; Therapy: 47ILN8360 to Recorded   3  Donepezil HCl - 5 MG Oral Tablet; Take 1 tablet by mouth at bedtime; Therapy: 12TMP6368 to (Evaluate:11Aug2017)  Requested for: 34Txg2129; Last   Rx:26Bdv2925 Ordered   4  Evista 60 MG Oral Tablet; TAKE ONE (1) TABLET BY MOUTH ONCE DAILY; Therapy: 32MLE2751 to Recorded   5  Fish Oil 1000 MG Oral Capsule; TAKE 1 CAPSULE DAILY; Therapy: 80FZL3930 to Recorded   6  Glucosamine 500 MG Oral Capsule; Take 1 three times daily; Therapy: 87LMZ7125 to Recorded   7  Lansoprazole 30 MG Oral Capsule Delayed Release; take 1 capsule daily; Therapy: 32ZFG2714 to (Evaluate:89Aok8357)  Requested for: 72Ufq9693; Last   Rx:67Xjj6826 Ordered   8  Magnesium 500 MG Oral Capsule; 1 tab PO daily; Therapy: 98VVM3852 to Recorded   9  Melatonin 3 MG Oral Tablet; One po q hs;   Therapy: 00Gmu9094 to (Last Rx:27Ygj1154)  Requested for: 22Nrb6368 Ordered   10  Myrbetriq 25 MG Oral Tablet Extended Release 24 Hour; Take 1 tablet daily; Therapy: 48LRM6966 to Recorded   11  NexIUM 40 MG Oral Capsule Delayed Release; TAKE 1 CAPSULE DAILY; Therapy: 62VOP1803 to (Evaluate:07Jun2016) Recorded   12  Os-Robert Calcium + D3 500-200 MG-UNIT Oral Tablet; TAKE 1 TABLET BY MOUTH 2    TIMES A DAY (OSTEOPEROSIS); Therapy: 63Ujj5768 to (Evaluate:13Oct2017)  Requested for: 77Oyk9531; Last    Rx:34Peu3684 Ordered   13  Raloxifene HCl - 60 MG Oral Tablet; TAKE 1 TABLET DAILY; Therapy: 48JOO4354 to (Evaluate:39Qtg1339)  Requested for: 03ZGS3313; Last    Rx:21Oah5885 Ordered   14  SB Low Dose ASA EC 81 MG Oral Tablet Delayed Release; Take 1 tablet daily; Therapy: 88URL5377 to (Evaluate:19Oct2013) Recorded   15  Travatan Z 0 004 % Ophthalmic Solution;     Therapy: 57OKS0596 to (Evaluate:19Nov2016) Recorded   16  Vitamin B-12 500 MCG Oral Tablet; TAKE 1 TABLET DAILY; Therapy: 72AKG7319 to (Evaluate:75Gps4545) Recorded  Medication List Reviewed: The medication list was reviewed and updated today  Allergies    1  Codeine Sulfate TABS   2  Fosamax TABS   3  Levaquin TABS   4  Penicillins    Vitals  Signs   Recorded: 86OVQ7941 09:41AM   Temperature: 98 F  Heart Rate: 88  Respiration: 20  Systolic: 561  Diastolic: 70  Height: 5 ft 3 5 in  Weight: 113 lb   BMI Calculated: 19 7  BSA Calculated: 1 53    Physical Exam    Constitutional   General appearance: No acute distress, well appearing and well nourished  Eyes   Conjunctiva and lids: No swelling, erythema or discharge  Pupils and irises: Equal, round and reactive to light  Ears, Nose, Mouth, and Throat   Oropharynx: Normal with no erythema, edema, exudate or lesions  Pulmonary   Respiratory effort: No increased work of breathing or signs of respiratory distress  Auscultation of lungs: Clear to auscultation  Cardiovascular   Auscultation of heart: Normal rate and rhythm, normal S1 and S2, without murmurs  Examination of extremities for edema and/or varicosities: Normal     Carotid pulses: Normal     Abdomen   Abdomen: Non-tender, no masses  Musculoskeletal   Gait and station: Normal     Skin   Examination of the skin for lesions: Abnormal   diffuse erythematous maculopapular lesions on the back  Non blanching and no vesicles  Neurologic   Cranial nerves: Cranial nerves 2-12 intact  Psychiatric   Orientation to person, place, and time: Normal     Mood and affect: Normal     Additional Exam:  Right index finger w/o gross deformity, increase temp, or erythema  Positive locking with flexion  Health Management  Encounter for screening mammogram for malignant neoplasm of breast   Digital Bilateral Screening Mammogram With CAD; every 1 year; Last 23HAE6550; Next Due:  12RQY7991;  Overdue    Signatures Electronically signed by : JOSUE De La Cruz ; Sep 22 2017 10:40AM EST                       (Author)

## 2018-01-12 NOTE — PROGRESS NOTES
Assessment    1  Abnormal thyroid blood test (794 5) (R94 6)   2  Elevated rheumatoid factor (795 79) (R76 8)   3  Fatigue (780 79) (R53 83)   4  Syncope (780 2) (R55)   5  Sleep disturbances (780 50) (G47 9)    Plan  Abnormal thyroid blood test    · (1) T3 TOTAL; Status:Active; Requested for:22Jun2016;    · (1) T4, FREE; Status:Active; Requested for:22Jun2016;    · (1) TSH; Status:Active; Requested for:22Jun2016;   Elevated rheumatoid factor    · *1 -  [facility] Physician Referral  Consult  Status: Hold For - Scheduling   Requested for: 21EMM3371  Care Summary provided  : Yes  Sleep disturbances    · Temazepam 7 5 MG Oral Capsule    Discussion/Summary  Discussion Summary:   Still feel syncope was vaso-vagal and will monitor  Will recheck the thyroid in six weeks  Refer to rheum to see if pt needs any further w/u or treatment  Continue with the melotonin for now  RTC two months for RTN  Chief Complaint  Chief Complaint Free Text Note Form: Pt here to review recent lab work  She also now takes melatonin instead of the temazepam        History of Present Illness  HPI: WF RTC for f/u near syncope, cold intolerence, fatigue, etc  No further events and felt that occurrence was due to vaso vagal event  Did not try the restoril for sleep as it was over $200  On melotonin and a little better  Labs with borderline low TSH and high RF  Review of Systems  Complete-Female:   Constitutional: feeling tired, but no fever, not feeling poorly and no chills  Cardiovascular: no chest pain and no palpitations  Respiratory: no shortness of breath  Gastrointestinal: no abdominal pain, no nausea, no constipation and no diarrhea  Genitourinary: No complaints of dysuria, no incontinence, no pelvic pain, no dysmenorrhea, no vaginal discharge or bleeding  Musculoskeletal: arthralgias and joint stiffness, but no joint swelling and no myalgias  Neurological: no headache  Active Problems    1  Asymptomatic menopausal state (V49 81) (Z78 0)   2  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 85) (K22 70)   3  Belching (787 3) (R14 2)   4  Cardiovascular arteriosclerosis (429 2,440 9) (I25 10)   5  Cervical facet syndrome (723 8) (M53 82)   6  Cervicalgia (723 1) (M54 2)   7  Cervico-occipital neuralgia of left side (723 8) (M54 81)   8  Change in stool habits (787 99) (R19 4)   9  Cold intolerance (780 99) (R68 89)   10  Daily nausea (787 02) (R11 0)   11  Decreased range of motion of intervertebral discs of cervical spine (723 8) (M53 82)   12  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) (M50 32)   13  Diverticulosis (562 10) (K57 90)   14  Drug intolerance (995 27) (Z78 9)   15  Dysgeusia (781 1) (R43 2)   16  Early satiety (780 94) (R68 81)   17  Encounter for screening mammogram for malignant neoplasm of breast (V76 12) (Z12 31)   18  Fall, initial encounter (E888 9) (W19 XXXA)   19  Fatigue (780 79) (R53 83)   20  Fibrocystic breast disease, unspecified laterality (610 1) (N60 19)   21  Gait disturbance (781 2) (R26 9)   22  Generalized abdominal pain (789 07) (R10 84)   23  Heartburn (787 1) (R12)   24  Hypercholesterolemia (272 0) (E78 0)   25  Hypertension (401 9) (I10)   26  Leg swelling (729 81) (M79 89)   27  Loss of appetite (783 0) (R63 0)   28  Memory Lapses Or Loss (780 93)   29  Non Hodgkin's lymphoma (202 80) (C85 90)   30  Osteoarthritis (715 90) (M19 90)   31  Osteoporosis (733 00) (M81 0)   32  Overactive bladder (596 51) (N32 81)   33  Raynaud's disease (443 0) (I73 00)   34  Rectal discomfort (569 42) (K62 89)   35  Sleep disturbances (780 50) (G47 9)   36  Special screening for other neurological conditions (V80 09) (Z13 89)   37  Stenosis, cervical spine (723 0) (M48 02)   38  Strain of right knee, initial encounter (844 9) (S86 911A)   39  Syncope (780 2) (R55)   40  Vertigo (780 4) (R42)    Past Medical History    1  History of Abnormal glucose (790 29) (R73 09)   2   History of Acute leg pain, right (729 5) (M79 604)   3  History of Costochondritis (733 6) (M94 0)   4  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)   5  History of Dysuria (788 1) (R30 0)   6  History of abnormal weight loss (V13 89) (Z87 898)   7  History of acute sinusitis (V12 69) (Z87 09)   8  History of allergic rhinitis (V12 69) (Z87 09)   9  History of cataract (V12 49) (Z86 69)   10  History of gastric ulcer (V12 79) (Z87 19)   11  History of herpes zoster (V12 09) (Z86 19)   12  History of paronychia of finger (V13 3) (Z87 2)   13  History of paronychia of finger (V13 3) (Z87 2)   14  History of sleep disturbance (V13 89) (Z87 898)   15  History of thrombocytopenia (V12 3) (Z86 2)   16  History of Internal Derangement Of Medial Meniscus Of Knee (717 3)   17  History of Joint pain, knee (719 46) (M25 569)   18  History of Knee Injury (959 7)   19  History of Lateral epicondylitis of right elbow (726 32) (M77 11)   20  History of Muscle Hematoma (924 9)   21  History of Skin lesion (709 9) (L98 9)   22  History of Sprained Ribs (848 3)   23  History of Synovial cyst of popliteal space (727 51) (M71 20)  Active Problems And Past Medical History Reviewed: The active problems and past medical history were reviewed and updated today  Surgical History    1  History of Cataract Surgery  Surgical History Reviewed: The surgical history was reviewed and updated today  Family History  Father    1  Family history of Colon Cancer (V16 0)   2  Family history of malignant neoplasm (V16 9) (Z80 9)  Paternal Grandmother    3  Family history of Malignant neoplasm of breast, unspecified laterality  Paternal Grandfather    4  Family history of Coronary artery disease (414 00) (I25 10)  Family History Reviewed: The family history was reviewed and updated today  Social History    · Never A Smoker   · Social alcohol use (Z78 9)  Social History Reviewed: The social history was reviewed and updated today   The social history was reviewed and is unchanged  Current Meds   1  Atenolol 25 MG Oral Tablet; take 1 tablet by mouth every day; Therapy: 06NRU3058 to (Evaluate:06Jun2016)  Requested for: 12Jun2015; Last Rx:12Jun2015   Ordered   2  Biotin 5000 MCG Oral Capsule; TAKE 1 CAPSULE DAILY; Therapy: 29Apr2014 to Recorded   3  Calcium Carbonate-Vitamin D 600-125 MG-UNIT TABS; Take 2 twice daily; Therapy: 00GDU7895 to Recorded   4  Colace 100 MG Oral Capsule; TAKE 1 CAPSULE DAILY; Therapy: 48OBG2601 to (21 ) Recorded   5  Donepezil HCl - 5 MG Oral Tablet; Take 1 tablet by mouth at bedtime; Therapy: 34LIT6004 to (Evaluate:31May2016)  Requested for: 48RIM0769; Last Rx:11Geq2751   Ordered   6  Fish Oil 1000 MG Oral Capsule; TAKE 1 CAPSULE DAILY; Therapy: 26BRL7218 to Recorded   7  Glucosamine 500 MG Oral Capsule; Take 1 three times daily; Therapy: 82IVV8348 to Recorded   8  Ibuprofen 600 MG Oral Tablet; TAKE 1 TABLET Every 6 hours PRN; Therapy: 98MXZ8326 to (Evaluate:09Jan2014)  Requested for: 93ZFA9036; Last Rx:87Tgj7254   Ordered   9  Lansoprazole 30 MG Oral Capsule Delayed Release; TAKE 1 CAPSULE DAILY; Therapy: 76FHO8821 to (Evaluate:25Nov2016)  Requested for: 65JEB3028; Last Rx:29May2016   Ordered   10  Magnesium 500 MG Oral Capsule; 1 tab PO daily; Therapy: 89XMA3127 to Recorded   11  Naproxen 375 MG Oral Tablet; TAKE 1 TABLET EVERY 12 HOURS AS NEEDED; Therapy: 64KMP4237 to (Evaluate:25Apr2016) Recorded   12  NexIUM 40 MG Oral Capsule Delayed Release; TAKE 1 CAPSULE DAILY; Therapy: 27EWV7566 to (Evaluate:07Jun2016) Recorded   13  Oscal 500/200 D-3 500-200 MG-UNIT Oral Tablet; TAKE 1 TABLET BY MOUTH 2 TIMES A DAY    (OSTEOPEROSIS); Therapy: 15OQY8659 to (Last Rx:17Jun2015)  Requested for: 56XEO1158 Ordered   14  Os-Robert Calcium + D3 500-200 MG-UNIT Oral Tablet; TAKE 1 TABLET BY MOUTH 2 TIMES A DAY    (OSTEOPEROSIS);     Therapy: 50Oal4674 to (Evaluate:18Jun2016)  Requested for: 71YQK0928; Last Rx:03Jdw5513    Ordered   15  Raloxifene HCl - 60 MG Oral Tablet; TAKE 1 TABLET DAILY; Therapy: 68YQP0854 to (Michael Jimenez)  Requested for: 89ONP0071; Last Rx:06Oct2015    Ordered   16  SB Low Dose ASA EC 81 MG Oral Tablet Delayed Release; Take 1 tablet daily; Therapy: 58MYG9176 to (Evaluate:19Oct2013) Recorded   17  Temazepam 7 5 MG Oral Capsule; TAKE 1 CAPSULE AT BEDTIME AS NEEDED; Therapy: 66GSY6186 to (Evaluate:06Oct2016); Last Rx:08Jun2016 Ordered   18  Ubiquinol 100 MG Oral Capsule; 2 tabs po daily; Therapy: 34HFH4148 to Recorded   19  Vitamin B-12 500 MCG Oral Tablet; TAKE 1 TABLET DAILY; Therapy: 99ZBD7281 to (Evaluate:23Fef4712) Recorded  Medication List Reviewed: The medication list was reviewed and updated today  Allergies    1  Codeine Sulfate TABS   2  Fosamax TABS   3  Levaquin TABS   4  Penicillins    Vitals  Vital Signs [Data Includes: Current Encounter]    Recorded: 17PMN9558 01:39PM   Temperature 98 4 F   Heart Rate 70   Respiration 18   Systolic 242   Diastolic 70   Height 5 ft 3 5 in   Weight 119 lb    BMI Calculated 20 75   BSA Calculated 1 56     Physical Exam    Constitutional   General appearance: No acute distress, well appearing and well nourished  Eyes   Conjunctiva and lids: No swelling, erythema or discharge  Pupils and irises: Equal, round and reactive to light  Ears, Nose, Mouth, and Throat   Oropharynx: Normal with no erythema, edema, exudate or lesions  Pulmonary   Respiratory effort: No increased work of breathing or signs of respiratory distress  Auscultation of lungs: Clear to auscultation  Cardiovascular   Auscultation of heart: Normal rate and rhythm, normal S1 and S2, without murmurs  Examination of extremities for edema and/or varicosities: Normal     Abdomen   Abdomen: Non-tender, no masses      Musculoskeletal   Gait and station: Normal     Inspection/palpation of joints, bones, and muscles: Abnormal   DIP and PIP nodes  No swan neck deformity, ulnar deviation, ballotment  Health Management  Encounter for screening mammogram for malignant neoplasm of breast   Digital Bilateral Screening Mammogram With CAD; every 1 year; Last 31KBF6973; Next Due:  54IOI5096;  Active    Signatures   Electronically signed by : JOSUE Duque ; Jun 22 2016  2:17PM EST                       (Author)

## 2018-01-14 VITALS
HEART RATE: 88 BPM | TEMPERATURE: 98 F | RESPIRATION RATE: 20 BRPM | DIASTOLIC BLOOD PRESSURE: 70 MMHG | SYSTOLIC BLOOD PRESSURE: 120 MMHG | BODY MASS INDEX: 19.29 KG/M2 | WEIGHT: 113 LBS | HEIGHT: 64 IN

## 2018-01-14 VITALS
HEIGHT: 64 IN | WEIGHT: 121 LBS | TEMPERATURE: 97.6 F | HEART RATE: 79 BPM | BODY MASS INDEX: 20.66 KG/M2 | DIASTOLIC BLOOD PRESSURE: 78 MMHG | SYSTOLIC BLOOD PRESSURE: 144 MMHG | RESPIRATION RATE: 18 BRPM

## 2018-01-15 VITALS
BODY MASS INDEX: 20.49 KG/M2 | DIASTOLIC BLOOD PRESSURE: 80 MMHG | WEIGHT: 120 LBS | HEART RATE: 76 BPM | RESPIRATION RATE: 18 BRPM | TEMPERATURE: 98.1 F | SYSTOLIC BLOOD PRESSURE: 138 MMHG | HEIGHT: 64 IN

## 2018-01-16 NOTE — RESULT NOTES
Verified Results  (1) T4, FREE [id number] 03:08PM iTaggitinda Suellen     Test Name Result Flag Reference   T4,FREE 1 10  0 60-1 70     (1) T3 TOTAL [id number] 03:09PM ARtunes Radioa Suellen     Test Name Result Flag Reference   T3 117 16  87      (1) TSH [id number] 03:07PM ARtunes Radioa Suellen     Test Name Result Flag Reference   TSH 0 727  0 46-4 98

## 2018-01-17 NOTE — RESULT NOTES
Verified Results  (1) COMPREHENSIVE METABOLIC PANEL [id number] 35:89UP Rafael Carrillo Order Number: [id number]     Test Name Result Flag Reference   SODIUM 138 mmol/L  136-145   POTASSIUM 3 8 mmol/L  3 5-5 3   CHLORIDE 104 mmol/L  100-108   CARBON DIOXIDE 26 mmol/L  21-32   ANION GAP (CALC) 8 mmol/L  4-13   BLOOD UREA NITROGEN 8 mg/dL  5-25   CREATININE 0 70 mg/dL  0 60-1 30   Standardized to IDMS reference method   CALCIUM 9 2 mg/dL  8 3-10 1   BILI, TOTAL 0 56 mg/dL  0 20-1 00   ALK PHOSPHATAS 92 U/L     ALT (SGPT) 26 U/L  12-78   Specimen collection should occur prior to Sulfasalazine and/or Sulfapyridine administration due to the potential for falsely depressed results  AST(SGOT) 15 U/L  5-45   Specimen collection should occur prior to Sulfasalazine administration due to the potential for falsely depressed results  ALBUMIN 3 1 g/dL L 3 5-5 0   TOTAL PROTEIN 6 5 g/dL  6 4-8 2   eGFR 78 ml/min/1 73sq [facility] Disease Education Program recommendations are as follows:  GFR calculation is accurate only with a steady state creatinine  Chronic Kidney disease less than 60 ml/min/1 73 sq  meters  Kidney failure less than 15 ml/min/1 73 sq  meters  GLUCOSE FASTING 142 mg/dL H 65-99   Specimen collection should occur prior to Sulfasalazine administration due to the potential for falsely depressed results  Specimen collection should occur prior to Sulfapyridine administration due to the potential for falsely elevated results  (1) CBC/PLT/DIFF [id number] 10:59AM Rafael Carrillo Order Number: [id number]     Test Name Result Flag Reference   WBC COUNT 3 84 Thousand/uL L 4 31-10 16   RBC COUNT 3 96 Million/uL  3 81-5 12   HEMOGLOBIN 12 2 g/dL  11 5-15 4   HEMATOCRIT 37 4 %  34 8-46  1   MCV 94 fL  82-98   MCH 30 8 pg  26 8-34 3   MCHC 32 6 g/dL  31 4-37 4   RDW 14 6 %  11 6-15 1   MPV 9 0 fL  8 9-12 7   PLATELET COUNT 713 Thousands/uL  149-390   nRBC AUTOMATED 0 /100 WBCs NEUTROPHILS RELATIVE PERCENT 69 %  43-75   LYMPHOCYTES RELATIVE PERCENT 21 %  14-44   MONOCYTES RELATIVE PERCENT 8 %  4-12   EOSINOPHILS RELATIVE PERCENT 1 %  0-6   BASOPHILS RELATIVE PERCENT 1 %  0-1   NEUTROPHILS ABSOLUTE COUNT 2 58 Thousands/? ??L  1 85-7 62   LYMPHOCYTES ABSOLUTE COUNT 0 82 Thousands/? ??L  0 60-4 47   MONOCYTES ABSOLUTE COUNT 0 30 Thousand/? ??L  0 17-1 22   EOSINOPHILS ABSOLUTE COUNT 0 05 Thousand/? ??L  0 00-0 61   BASOPHILS ABSOLUTE COUNT 0 03 Thousands/? ??L  0 00-0 10

## 2018-01-22 VITALS
SYSTOLIC BLOOD PRESSURE: 114 MMHG | WEIGHT: 110 LBS | DIASTOLIC BLOOD PRESSURE: 76 MMHG | HEIGHT: 64 IN | TEMPERATURE: 98.6 F | HEART RATE: 92 BPM | RESPIRATION RATE: 20 BRPM | BODY MASS INDEX: 18.78 KG/M2

## 2018-01-22 VITALS
DIASTOLIC BLOOD PRESSURE: 62 MMHG | WEIGHT: 117 LBS | SYSTOLIC BLOOD PRESSURE: 108 MMHG | RESPIRATION RATE: 24 BRPM | TEMPERATURE: 97.2 F | HEART RATE: 92 BPM | HEIGHT: 64 IN | BODY MASS INDEX: 19.97 KG/M2

## 2018-01-22 VITALS
HEART RATE: 76 BPM | DIASTOLIC BLOOD PRESSURE: 62 MMHG | TEMPERATURE: 97.3 F | RESPIRATION RATE: 24 BRPM | HEIGHT: 64 IN | OXYGEN SATURATION: 99 % | BODY MASS INDEX: 19.81 KG/M2 | SYSTOLIC BLOOD PRESSURE: 98 MMHG | WEIGHT: 116 LBS

## 2018-03-23 ENCOUNTER — APPOINTMENT (OUTPATIENT)
Dept: RADIOLOGY | Facility: CLINIC | Age: 83
End: 2018-03-23
Payer: MEDICARE

## 2018-03-23 ENCOUNTER — OFFICE VISIT (OUTPATIENT)
Dept: URGENT CARE | Facility: CLINIC | Age: 83
End: 2018-03-23
Payer: MEDICARE

## 2018-03-23 VITALS
OXYGEN SATURATION: 99 % | HEART RATE: 80 BPM | RESPIRATION RATE: 16 BRPM | SYSTOLIC BLOOD PRESSURE: 148 MMHG | DIASTOLIC BLOOD PRESSURE: 76 MMHG | TEMPERATURE: 97.4 F

## 2018-03-23 DIAGNOSIS — M25.552 LEFT HIP PAIN: ICD-10-CM

## 2018-03-23 DIAGNOSIS — S41.112A SKIN TEAR OF LEFT UPPER ARM WITHOUT COMPLICATION, INITIAL ENCOUNTER: ICD-10-CM

## 2018-03-23 DIAGNOSIS — M25.552 LEFT HIP PAIN: Primary | ICD-10-CM

## 2018-03-23 PROCEDURE — G0463 HOSPITAL OUTPT CLINIC VISIT: HCPCS | Performed by: NURSE PRACTITIONER

## 2018-03-23 PROCEDURE — 73502 X-RAY EXAM HIP UNI 2-3 VIEWS: CPT

## 2018-03-23 PROCEDURE — 99213 OFFICE O/P EST LOW 20 MIN: CPT | Performed by: NURSE PRACTITIONER

## 2018-03-23 RX ORDER — FLUTICASONE PROPIONATE 50 MCG
SPRAY, SUSPENSION (ML) NASAL
COMMUNITY
Start: 2018-01-06

## 2018-03-23 RX ORDER — MIRABEGRON 25 MG/1
TABLET, FILM COATED, EXTENDED RELEASE ORAL
COMMUNITY
Start: 2018-02-26

## 2018-03-23 RX ORDER — DONEPEZIL HYDROCHLORIDE 10 MG/1
1 TABLET, FILM COATED ORAL DAILY
COMMUNITY
Start: 2014-01-02

## 2018-03-23 RX ORDER — ATENOLOL 25 MG/1
TABLET ORAL
COMMUNITY
Start: 2018-01-25

## 2018-03-23 RX ORDER — RALOXIFENE HYDROCHLORIDE 60 MG/1
1 TABLET, FILM COATED ORAL DAILY
COMMUNITY
Start: 2017-09-22

## 2018-03-23 RX ORDER — BIOTIN 1 MG
5000 TABLET ORAL
COMMUNITY

## 2018-03-23 RX ORDER — BRIMONIDINE TARTRATE 0.15 %
DROPS OPHTHALMIC (EYE)
COMMUNITY
Start: 2018-03-22

## 2018-03-23 RX ORDER — CALCIUM CARBONATE/VITAMIN D3 500-3.125
1 TABLET ORAL 2 TIMES DAILY
Refills: 3 | COMMUNITY
Start: 2018-01-19

## 2018-03-23 RX ORDER — ASCORBATE CALCIUM 500 MG
500 TABLET ORAL
COMMUNITY

## 2018-03-23 RX ORDER — DONEPEZIL HYDROCHLORIDE 5 MG/1
1 TABLET, FILM COATED ORAL
COMMUNITY
Start: 2016-10-07

## 2018-03-23 NOTE — PROGRESS NOTES
[facility]        NAME: Farhat Mcqueen is a 80 y o  female  : 1929    MRN: [id number]  DATE: 2018  TIME: 12:38 PM    Assessment and Plan   Left hip pain [M25 552]  1  Left hip pain           Patient Instructions       Follow up with PCP in 3-5 days  Proceed to  ER if symptoms worsen  Chief Complaint     Chief Complaint   Patient presents with    Hip Pain         History of Present Illness       Chart was opened in error, patient not seen by myself  Review of Systems   Review of Systems      Current Medications       Current Outpatient Prescriptions:     atenolol (TENORMIN) 25 mg tablet, , Disp: , Rfl:     Biotin 1000 MCG tablet, Take 5,000 mcg by mouth, Disp: , Rfl:     brimonidine (ALPHAGAN P) 0 15 % ophthalmic solution, , Disp: , Rfl:     Calcium Ascorbate 500 MG TABS, Take 500 mg by mouth, Disp: , Rfl:     OS-JUANITA CALCIUM + D3 500-200 MG-UNIT TABS, Take 1 tablet by mouth 2 (two) times a day, Disp: , Rfl: 3    Current Allergies     Allergies as of 2018 - never reviewed   Allergen Reaction Noted    Alendronate Rash 10/06/2015    Codeine Hives and Rash 2012    Levofloxacin Rash 2012    Penicillins Rash and Other (See Comments) 2012            The following portions of the patient's history were reviewed and updated as appropriate: allergies, current medications, past family history, past medical history, past social history, past surgical history and problem list      No past medical history on file  No past surgical history on file  No family history on file  Medications have been verified          Objective   /76 (BP Location: Right arm, Patient Position: Standing)   Pulse 80   Temp (!) 97 4 °F (36 3 °C) (Tympanic)   Resp 16   SpO2 99%        Physical Exam no     Physical Exam

## 2018-03-23 NOTE — PATIENT INSTRUCTIONS
Xray appears negative for any fracture  Will follow up with radiologist report when available  Recommend elevating body part, icing the area every 2 hours for 20-30 minutes, take Ibuprofen every 6-8 hours to reduce inflammation  If not improving over the next week, follow up with PCP or orthopedics     Instructed in wound care to skin tear left forearm wash daily antibacterial soap water may use over-the-counter Neosporin and cover with nonadherent dressing patient verbalizes understanding these discharge instructions

## 2018-03-23 NOTE — PROGRESS NOTES
[facility]        NAME: Terrance Rogers is a 80 y o  female  : 1929    MRN: [id number]  DATE: 2018  TIME: 1:00 PM    Assessment and Plan   Left hip pain [M25 552]  1  Left hip pain  XR hip/pelv 2-3 vws left if performed   2  Skin tear of left upper arm without complication, initial encounter           Patient Instructions       Follow up with PCP in 3-5 days  Proceed to  ER if symptoms worsen  Xray appears negative for any fracture  Will follow up with radiologist report when available  Recommend elevating body part, icing the area every 2 hours for 20-30 minutes, take Ibuprofen every 6-8 hours to reduce inflammation  If not improving over the next week, follow up with PCP or orthopedics  Instructed in wound care to skin tear left forearm wash daily antibacterial soap water may use over-the-counter Neosporin and cover with nonadherent dressing patient verbalizes understanding these discharge instructions         Chief Complaint     Chief Complaint   Patient presents with    Hip Pain         History of Present Illness       59-year-old female presents urgent care chief of a fall 1 week ago  Denies hitting head during this stated landed on left hip she is complaining of left hip pain  She has been using over-the-counter left hip has had no problems with weight-bearing pain is not made worse with weight-bearing or movement  She has a superficial skin tear left forearm, from fall 1 week ago      Hip Pain          Review of Systems   Review of Systems   Constitutional: Negative  HENT: Negative  Eyes: Negative  Respiratory: Negative  Cardiovascular: Negative  Gastrointestinal: Negative  Endocrine: Negative  Genitourinary: Negative  Musculoskeletal: Positive for joint swelling (left hip pain )  Skin: Positive for wound  Rash: skin tear left forearm    Allergic/Immunologic: Negative  Neurological: Negative  Hematological: Negative      Psychiatric/Behavioral: Negative  Current Medications       Current Outpatient Prescriptions:     Calcium Carbonate-Vitamin D 600-125 MG-UNIT TABS, Take by mouth 2 (two) times a day, Disp: , Rfl:     donepezil (ARICEPT) 10 mg tablet, Take 1 tablet by mouth daily, Disp: , Rfl:     donepezil (ARICEPT) 5 mg tablet, 1 tablet, Disp: , Rfl:     raloxifene (EVISTA) 60 mg tablet, Take 1 tablet by mouth daily, Disp: , Rfl:     atenolol (TENORMIN) 25 mg tablet, , Disp: , Rfl:     Biotin 1000 MCG tablet, Take 5,000 mcg by mouth, Disp: , Rfl:     brimonidine (ALPHAGAN P) 0 15 % ophthalmic solution, , Disp: , Rfl:     Calcium Ascorbate 500 MG TABS, Take 500 mg by mouth, Disp: , Rfl:     fluticasone (FLONASE) 50 mcg/act nasal spray, , Disp: , Rfl:     MYRBETRIQ 25 MG TB24, , Disp: , Rfl:     OS-JUANITA CALCIUM + D3 500-200 MG-UNIT TABS, Take 1 tablet by mouth 2 (two) times a day, Disp: , Rfl: 3    Current Allergies     Allergies as of 03/23/2018 - Reviewed 03/23/2018   Allergen Reaction Noted    Alendronate Rash 10/06/2015    Codeine Hives and Rash 04/21/2012    Levofloxacin Rash 04/21/2012    Penicillins Rash and Other (See Comments) 06/08/2012            The following portions of the patient's history were reviewed and updated as appropriate: allergies, current medications, past family history, past medical history, past social history, past surgical history and problem list      No past medical history on file  No past surgical history on file  No family history on file  Medications have been verified  Objective   /76 (BP Location: Right arm, Patient Position: Standing)   Pulse 80   Temp (!) 97 4 °F (36 3 °C) (Tympanic)   Resp 16   SpO2 99%        Physical Exam     Physical Exam   Constitutional: She is oriented to person, place, and time  Vital signs are normal  She appears well-developed and well-nourished  She is active and cooperative  HENT:   Head: Normocephalic and atraumatic     Right Ear: Hearing normal    Left Ear: Hearing normal    Nose: Nose normal    Mouth/Throat: Uvula is midline, oropharynx is clear and moist and mucous membranes are normal    Eyes: EOM are normal  Pupils are equal, round, and reactive to light  Neck: Normal range of motion and full passive range of motion without pain  Cardiovascular: Normal rate, regular rhythm, normal heart sounds and normal pulses  Pulmonary/Chest: Effort normal and breath sounds normal    Musculoskeletal:        Left hip: She exhibits tenderness  She exhibits normal range of motion, normal strength, no bony tenderness, no swelling, no crepitus, no deformity and no laceration  Neurological: She is alert and oriented to person, place, and time  Skin:        Psychiatric: She has a normal mood and affect   Her speech is normal and behavior is normal  Judgment and thought content normal  Cognition and memory are normal

## 2018-03-26 LAB
ALBUMIN SERPL BCP-MCNC: 4.5 G/DL (ref 3.5–5.7)
ALP SERPL-CCNC: 36 IU/L (ref 55–165)
ALT SERPL W P-5'-P-CCNC: 12 IU/L (ref 5–26)
ANION GAP SERPL CALCULATED.3IONS-SCNC: 11.3 MM/L
AST SERPL W P-5'-P-CCNC: 14 U/L (ref 7–26)
BASOPHILS # BLD AUTO: 0 X3/UL (ref 0–0.3)
BASOPHILS # BLD AUTO: 0.6 % (ref 0–2)
BILIRUB SERPL-MCNC: 0.8 MG/DL (ref 0.3–1)
BUN SERPL-MCNC: 16 MG/DL (ref 7–25)
CALCIUM SERPL-MCNC: 9.5 MG/DL (ref 8.6–10.5)
CHLORIDE SERPL-SCNC: 102 MM/L (ref 98–107)
CO2 SERPL-SCNC: 28 MM/L (ref 21–31)
CREAT SERPL-MCNC: 0.72 MG/DL (ref 0.6–1.2)
DEPRECATED RDW RBC AUTO: 13 % (ref 11.5–14.5)
EGFR (HISTORICAL): > 60 GFR
EGFR AFRICAN AMERICAN (HISTORICAL): > 60 GFR
EOSINOPHIL # BLD AUTO: 0 X3/UL (ref 0–0.5)
EOSINOPHIL NFR BLD AUTO: 0.9 % (ref 0–5)
GLUCOSE (HISTORICAL): 118 MG/DL (ref 65–99)
HCT VFR BLD AUTO: 39.4 % (ref 37–47)
HGB BLD-MCNC: 13.2 G/DL (ref 12–16)
LDH (HISTORICAL): 104 IU/L (ref 84–246)
LYMPHOCYTES # BLD AUTO: 0.9 X3/UL (ref 1.2–4.2)
LYMPHOCYTES NFR BLD AUTO: 22.5 % (ref 20.5–51.1)
MCH RBC QN AUTO: 30.7 PG (ref 26–34)
MCHC RBC AUTO-ENTMCNC: 33.6 G/DL (ref 31–36)
MCV RBC AUTO: 91.2 FL (ref 81–99)
MONOCYTES # BLD AUTO: 0.2 X3/UL (ref 0–1)
MONOCYTES NFR BLD AUTO: 5.3 % (ref 1.7–12)
NEUTROPHILS # BLD AUTO: 2.9 X3/UL (ref 1.4–6.5)
NEUTS SEG NFR BLD AUTO: 70.7 % (ref 42.2–75.2)
OSMOLALITY, SERUM (HISTORICAL): 276 MOSM (ref 262–291)
PLATELET # BLD AUTO: 106 X3/UL (ref 130–400)
PMV BLD AUTO: 7.6 FL (ref 8.6–11.7)
POTASSIUM SERPL-SCNC: 4.3 MM/L (ref 3.5–5.5)
RBC # BLD AUTO: 4.32 X6/UL (ref 3.9–5.2)
SODIUM SERPL-SCNC: 137 MM/L (ref 134–143)
TOTAL PROTEIN (HISTORICAL): 6.8 G/DL (ref 6.4–8.9)
WBC # BLD AUTO: 4.1 X3/UL (ref 4.8–10.8)

## 2018-04-14 ENCOUNTER — OFFICE VISIT (OUTPATIENT)
Dept: URGENT CARE | Facility: CLINIC | Age: 83
End: 2018-04-14
Payer: MEDICARE

## 2018-04-14 VITALS
TEMPERATURE: 97 F | RESPIRATION RATE: 16 BRPM | DIASTOLIC BLOOD PRESSURE: 67 MMHG | HEART RATE: 73 BPM | OXYGEN SATURATION: 100 % | SYSTOLIC BLOOD PRESSURE: 154 MMHG

## 2018-04-14 DIAGNOSIS — S09.90XA INJURY OF HEAD, INITIAL ENCOUNTER: Primary | ICD-10-CM

## 2018-04-14 PROCEDURE — 99213 OFFICE O/P EST LOW 20 MIN: CPT | Performed by: NURSE PRACTITIONER

## 2018-04-14 PROCEDURE — G0463 HOSPITAL OUTPT CLINIC VISIT: HCPCS | Performed by: NURSE PRACTITIONER

## 2018-04-14 RX ORDER — TRAVOPROST OPHTHALMIC SOLUTION 0.04 MG/ML
1 SOLUTION OPHTHALMIC
COMMUNITY
Start: 2016-10-17

## 2018-04-14 RX ORDER — FESOTERODINE FUMARATE 8 MG/1
1 TABLET, EXTENDED RELEASE ORAL
COMMUNITY
Start: 2016-08-31

## 2018-04-14 RX ORDER — ZOLPIDEM TARTRATE 5 MG/1
TABLET ORAL
COMMUNITY
Start: 2018-02-26

## 2018-04-14 NOTE — PROGRESS NOTES
[facility]        NAME: Trent Stark is a 80 y o  female  : 1929    MRN: [id number]  DATE: 2018  TIME: 9:50 AM    Assessment and Plan   Injury of head, initial encounter [S09 90XA]  1  Injury of head, initial encounter           Patient Instructions     Due to chief complaint of fall with head injury recommended further evaluation at ED patient and her daughter are in agreement with this  She will go to [facility] ED via personal car with daughter driving  Follow up with PCP in 3-5 days  Proceed to  ER if symptoms worsen  Chief Complaint     Chief Complaint   Patient presents with    Facial Pain     x 3 days    Fall    Head Injury         History of Present Illness       80year-old female presents urgent care with chief complaint of fall 3 days ago  During fall she struck left side of head and face bruising noted to the site  She denies any loss of consciousness dizziness blurred vision since the time of the fall she is complaining of left-sided facial pain and intermittent headaches since the fall  She has not used any over-the-counter medications reports worsening in symptoms  Fall   Associated symptoms include headaches  Pertinent negatives include no numbness  Review of Systems   Review of Systems   Constitutional: Negative  HENT: Negative  Eyes: Negative  Respiratory: Negative  Cardiovascular: Negative  Gastrointestinal: Negative  Endocrine: Negative  Genitourinary: Negative  Musculoskeletal: Negative  Skin: Negative  Allergic/Immunologic: Negative  Neurological: Positive for headaches  Negative for dizziness, tremors, seizures, syncope, facial asymmetry, speech difficulty, weakness, light-headedness and numbness  Hematological: Negative  Psychiatric/Behavioral: Negative            Current Medications       Current Outpatient Prescriptions:     Biotin 1000 MCG tablet, Take 5,000 mcg by mouth, Disp: , Rfl:     brimonidine (ALPHAGAN P) 0 15 % ophthalmic solution, , Disp: , Rfl:     donepezil (ARICEPT) 10 mg tablet, Take 1 tablet by mouth daily, Disp: , Rfl:     fluticasone (FLONASE) 50 mcg/act nasal spray, , Disp: , Rfl:     MYRBETRIQ 25 MG TB24, , Disp: , Rfl:     OS-JUANITA CALCIUM + D3 500-200 MG-UNIT TABS, Take 1 tablet by mouth 2 (two) times a day, Disp: , Rfl: 3    raloxifene (EVISTA) 60 mg tablet, Take 1 tablet by mouth daily, Disp: , Rfl:     travoprost (TRAVATAN Z) 0 004 % ophthalmic solution, 1 drop, Disp: , Rfl:     zolpidem (AMBIEN) 5 mg tablet, , Disp: , Rfl:     atenolol (TENORMIN) 25 mg tablet, , Disp: , Rfl:     Calcium Ascorbate 500 MG TABS, Take 500 mg by mouth, Disp: , Rfl:     Calcium Carbonate-Vitamin D 600-125 MG-UNIT TABS, Take by mouth 2 (two) times a day, Disp: , Rfl:     donepezil (ARICEPT) 5 mg tablet, 1 tablet, Disp: , Rfl:     Fesoterodine Fumarate ER (TOVIAZ) 8 MG TB24, 1 tablet, Disp: , Rfl:     Current Allergies     Allergies as of 04/14/2018 - Reviewed 04/14/2018   Allergen Reaction Noted    Alendronate Rash 10/06/2015    Codeine Hives and Rash 04/21/2012    Levofloxacin Rash 04/21/2012    Penicillins Rash and Other (See Comments) 06/08/2012            The following portions of the patient's history were reviewed and updated as appropriate: allergies, current medications, past family history, past medical history, past social history, past surgical history and problem list      Past Medical History:   Diagnosis Date    Bladder dysfunction     Dementia     GERD (gastroesophageal reflux disease)     Hypertension     Takes daily multivitamins        No past surgical history on file  No family history on file  Medications have been verified  Objective   /67   Pulse 73   Temp (!) 97 °F (36 1 °C)   Resp 16   SpO2 100%        Physical Exam     Physical Exam   Constitutional: She is oriented to person, place, and time   Vital signs are normal  She appears well-developed and well-nourished  She is active and cooperative  HENT:   Head: Head is with contusion  Right Ear: Hearing and external ear normal    Left Ear: Hearing and external ear normal    Nose: Nose normal    Mouth/Throat: Uvula is midline, oropharynx is clear and moist and mucous membranes are normal    Eyes: Conjunctivae and EOM are normal  Pupils are equal, round, and reactive to light  Cardiovascular: Normal rate, regular rhythm, normal heart sounds and normal pulses  Pulmonary/Chest: Effort normal and breath sounds normal    Musculoskeletal: Normal range of motion  Neurological: She is alert and oriented to person, place, and time  She has normal strength  She displays a negative Romberg sign  GCS eye subscore is 4  GCS verbal subscore is 5  GCS motor subscore is 6  Skin: Ecchymosis noted  Psychiatric: She has a normal mood and affect   Her speech is normal and behavior is normal  Judgment and thought content normal  Cognition and memory are normal

## 2018-04-14 NOTE — PATIENT INSTRUCTIONS
Due to chief complaint of fall with head injury recommended further evaluation at ED patient and her daughter are in agreement with this  She will go to [place] ED via personal car with daughter driving

## 2018-04-23 RX ORDER — ATENOLOL 25 MG/1
TABLET ORAL
Qty: 90 TABLET | Refills: 2 | OUTPATIENT
Start: 2018-04-23